# Patient Record
Sex: FEMALE | Race: WHITE | Employment: STUDENT | URBAN - NONMETROPOLITAN AREA
[De-identification: names, ages, dates, MRNs, and addresses within clinical notes are randomized per-mention and may not be internally consistent; named-entity substitution may affect disease eponyms.]

---
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
## 2022-02-28 ENCOUNTER — TELEPHONE (OUTPATIENT)
Dept: OBGYN CLINIC | Age: 22
End: 2022-02-28

## 2022-05-31 NOTE — TELEPHONE ENCOUNTER
I received a call from Saeid Narvaez at [facility] regarding patient's PA for physical therapy. They had 5 requests and all have been approved. Valid dates of 6/7/22-8/5/22. Auth # is [id number]. She said if you had any questions, you can contact her at [phone]. EENMT